# Patient Record
Sex: MALE | Race: WHITE | URBAN - METROPOLITAN AREA
[De-identification: names, ages, dates, MRNs, and addresses within clinical notes are randomized per-mention and may not be internally consistent; named-entity substitution may affect disease eponyms.]

---

## 2017-12-13 ENCOUNTER — OFFICE VISIT (OUTPATIENT)
Dept: FAMILY MEDICINE | Facility: CLINIC | Age: 22
End: 2017-12-13

## 2017-12-13 ENCOUNTER — RADIANT APPOINTMENT (OUTPATIENT)
Dept: GENERAL RADIOLOGY | Facility: CLINIC | Age: 22
End: 2017-12-13
Attending: PHYSICIAN ASSISTANT

## 2017-12-13 VITALS
BODY MASS INDEX: 30.62 KG/M2 | SYSTOLIC BLOOD PRESSURE: 106 MMHG | HEIGHT: 66 IN | WEIGHT: 190.5 LBS | DIASTOLIC BLOOD PRESSURE: 70 MMHG | OXYGEN SATURATION: 97 % | HEART RATE: 88 BPM | TEMPERATURE: 98.2 F

## 2017-12-13 DIAGNOSIS — Z23 NEED FOR PROPHYLACTIC VACCINATION AND INOCULATION AGAINST INFLUENZA: ICD-10-CM

## 2017-12-13 DIAGNOSIS — M79.642 PAIN OF LEFT HAND: ICD-10-CM

## 2017-12-13 DIAGNOSIS — S60.032S: Primary | ICD-10-CM

## 2017-12-13 PROCEDURE — 90686 IIV4 VACC NO PRSV 0.5 ML IM: CPT | Performed by: PHYSICIAN ASSISTANT

## 2017-12-13 PROCEDURE — 90471 IMMUNIZATION ADMIN: CPT | Performed by: PHYSICIAN ASSISTANT

## 2017-12-13 PROCEDURE — 73130 X-RAY EXAM OF HAND: CPT | Mod: LT

## 2017-12-13 PROCEDURE — 99203 OFFICE O/P NEW LOW 30 MIN: CPT | Mod: 25 | Performed by: PHYSICIAN ASSISTANT

## 2017-12-13 ASSESSMENT — ANXIETY QUESTIONNAIRES
GAD7 TOTAL SCORE: 2
5. BEING SO RESTLESS THAT IT IS HARD TO SIT STILL: NOT AT ALL
2. NOT BEING ABLE TO STOP OR CONTROL WORRYING: NOT AT ALL
IF YOU CHECKED OFF ANY PROBLEMS ON THIS QUESTIONNAIRE, HOW DIFFICULT HAVE THESE PROBLEMS MADE IT FOR YOU TO DO YOUR WORK, TAKE CARE OF THINGS AT HOME, OR GET ALONG WITH OTHER PEOPLE: NOT DIFFICULT AT ALL
6. BECOMING EASILY ANNOYED OR IRRITABLE: SEVERAL DAYS
3. WORRYING TOO MUCH ABOUT DIFFERENT THINGS: NOT AT ALL
7. FEELING AFRAID AS IF SOMETHING AWFUL MIGHT HAPPEN: NOT AT ALL
1. FEELING NERVOUS, ANXIOUS, OR ON EDGE: SEVERAL DAYS

## 2017-12-13 ASSESSMENT — PATIENT HEALTH QUESTIONNAIRE - PHQ9
SUM OF ALL RESPONSES TO PHQ QUESTIONS 1-9: 2
5. POOR APPETITE OR OVEREATING: NOT AT ALL

## 2017-12-13 NOTE — PATIENT INSTRUCTIONS
Finger Contusion  You have a contusion. This is also called a bruise. There is swelling and some bleeding under the skin, but no broken bones. This injury generally takes a few days to a few weeks to heal. During that time, the bruise will typically change in color from reddish, to purple-blue, to greenish-yellow, then to yellow-brown.  A finger contusion may be treated with a splint or mandi tape (taping the injured finger to the one next to it for support). Minor contusions likely will need no other treatment.  Home care    Elevate the hand to reduce pain and swelling. As much as possible, sit or lie down with the hand raised about the level of your heart. This is especially important during the first 48 hours.    Ice the finger to help reduce pain and swelling. Wrap a cold source (ice pack or ice cubes in a plastic bag) in a thin towel. Apply to the bruised finger for 20 minutes every 1 to 2 hours the first day. Continue this 3 to 4 times a day until the pain and swelling goes away.    If mandi tape was applied and it becomes wet or dirty, change it. You may replace it with paper, plastic, or cloth tape. Before taping, put a thin strip of cotton or gauze between the fingers to absorb sweat. This will help prevent any breakdown of skin or fungal infections.     Unless another medicine was prescribed, you can take acetaminophen, ibuprofen, or naproxen to control pain. (If you have chronic liver or kidney disease or ever had a stomach ulcer or gastrointestinal bleeding, talk with your doctor before using these medicines.)  Follow up  Follow up with your healthcare provider, or as advised. Call if you are not improving within 1 to 2 weeks.  When to seek medical advice   Call your healthcare provider right away if you have any of the following:    Increased pain or swelling    Hand or arm becomes cold, blue, numb or tingly    Signs of infection: Warmth, drainage, or increased redness or pain around the bruise     Inability to move the injured finger or hand     Frequent bruising for unknown reasons  Date Last Reviewed: 5/1/2017 2000-2017 [employer]. [address] [place] [address], [place] [address]. All rights reserved. This information is not intended as a substitute for professional medical care. Always follow your healthcare professional's instructions.

## 2017-12-13 NOTE — PROGRESS NOTES

## 2017-12-13 NOTE — NURSING NOTE
"Chief Complaint   Patient presents with     Musculoskeletal Problem     left hand middle finger pain while setting up ladder        Initial /70  Temp 98.2  F (36.8  C) (Tympanic)  Ht 5' 6\" (1.676 m)  Wt 190 lb 8 oz (86.4 kg)  SpO2 97%  BMI 30.75 kg/m2 Estimated body mass index is 30.75 kg/(m^2) as calculated from the following:    Height as of this encounter: 5' 6\" (1.676 m).    Weight as of this encounter: 190 lb 8 oz (86.4 kg).  Medication Reconciliation: complete    "

## 2017-12-13 NOTE — LETTER
[facility]  [address] [place] [address]                              [phone]   December 13, 2017    Mathew Grove  [address] [place] [address]      Dear Mathew,    Here is a summary of your recent test results:  Your Xray was normal.    Your tests results are enclosed.    Please call us at [phone] (or use [employer]) to address the above recommendations.            Thank you very much for choosing [facility].     Best regards,      Sara Abarca PA-C    Results for orders placed or performed in visit on 12/13/17   XR Hand Left G/E 3 Views    Narrative    LEFT HAND THREE OR MORE VIEWS  12/13/2017 1:34 PM     HISTORY:  Pain of left hand.    COMPARISON: None.      Impression    IMPRESSION: Bones are normally aligned. No acute fracture.    OREN ALEXANDER MD

## 2017-12-13 NOTE — MR AVS SNAPSHOT
After Visit Summary   12/13/2017    Mathew Grove    MRN: [id number]           Patient Information     Date Of Birth          1995        Visit Information        Provider Department      12/13/2017 2:00 PM Sara Abarca PA-C [facility] [place]        Today's Diagnoses     Contusion of left middle finger without damage to nail, sequela    -  1    Pain of left hand          Care Instructions      Finger Contusion  You have a contusion. This is also called a bruise. There is swelling and some bleeding under the skin, but no broken bones. This injury generally takes a few days to a few weeks to heal. During that time, the bruise will typically change in color from reddish, to purple-blue, to greenish-yellow, then to yellow-brown.  A finger contusion may be treated with a splint or mandi tape (taping the injured finger to the one next to it for support). Minor contusions likely will need no other treatment.  Home care    Elevate the hand to reduce pain and swelling. As much as possible, sit or lie down with the hand raised about the level of your heart. This is especially important during the first 48 hours.    Ice the finger to help reduce pain and swelling. Wrap a cold source (ice pack or ice cubes in a plastic bag) in a thin towel. Apply to the bruised finger for 20 minutes every 1 to 2 hours the first day. Continue this 3 to 4 times a day until the pain and swelling goes away.    If mandi tape was applied and it becomes wet or dirty, change it. You may replace it with paper, plastic, or cloth tape. Before taping, put a thin strip of cotton or gauze between the fingers to absorb sweat. This will help prevent any breakdown of skin or fungal infections.     Unless another medicine was prescribed, you can take acetaminophen, ibuprofen, or naproxen to control pain. (If you have chronic liver or kidney disease or ever had a stomach ulcer or gastrointestinal bleeding, talk with your  doctor before using these medicines.)  Follow up  Follow up with your healthcare provider, or as advised. Call if you are not improving within 1 to 2 weeks.  When to seek medical advice   Call your healthcare provider right away if you have any of the following:    Increased pain or swelling    Hand or arm becomes cold, blue, numb or tingly    Signs of infection: Warmth, drainage, or increased redness or pain around the bruise    Inability to move the injured finger or hand     Frequent bruising for unknown reasons  Date Last Reviewed: 5/1/2017 2000-2017 [employer]. [address] [place] [address]. All rights reserved. This information is not intended as a substitute for professional medical care. Always follow your healthcare professional's instructions.                Follow-ups after your visit        Your next 10 appointments already scheduled     Dec 13, 2017  2:00 PM CST   Office Visit with Sara Abarca PA-C   [facility] ([facility]    [address] [place] [phone]           Bring a current list of meds and any records pertaining to this visit. For Physicals, please bring immunization records and any forms needing to be filled out. Please arrive 10 minutes early to complete paperwork.              Who to contact     If you have questions or need follow up information about today's clinic visit or your schedule please contact [facility] directly at [phone].  Normal or non-critical lab and imaging results will be communicated to you by MyChart, letter or phone within 4 business days after the clinic has received the results. If you do not hear from us within 7 days, please contact the clinic through MyChart or phone. If you have a critical or abnormal lab result, we will notify you by phone as soon as possible.  Submit refill requests through [employer] or call your pharmacy and they  "will forward the refill request to us. Please allow 3 business days for your refill to be completed.          Additional Information About Your Visit        [employer] Information     [employer] lets you send messages to your doctor, view your test results, renew your prescriptions, schedule appointments and more. To sign up, go to [email or web address] . Click on \"Log in\" on the left side of the screen, which will take you to the Welcome page. Then click on \"Sign up Now\" on the right side of the page.     You will be asked to enter the access code listed below, as well as some personal information. Please follow the directions to create your username and password.     Your access code is: [email or web address]  Expires: 3/13/2018  1:38 PM     Your access code will  in 90 days. If you need help or a new code, please call your [place] clinic or [phone].        Care EveryWhere ID     This is your Care EveryWhere ID. This could be used by other organizations to access your [place] medical records  [id number]        Your Vitals Were     Pulse Temperature Height Pulse Oximetry BMI (Body Mass Index)       88 98.2  F (36.8  C) (Tympanic) 5' 6\" (1.676 m) 97% 30.75 kg/m2        Blood Pressure from Last 3 Encounters:   17 106/70   12 106/76   12 110/70    Weight from Last 3 Encounters:   17 190 lb 8 oz (86.4 kg)   12 144 lb (65.3 kg) (39 %)*   12 152 lb (68.9 kg) (56 %)*     * Growth percentiles are based on CDC 2-20 Years data.               Primary Care Provider Office Phone # Fax #    Tray Buenrostro PA-C [phone] [address] [place] [address]        Equal Access to Services     [facility] REGULO : Екатерина Gomez, wafabiana luqadaha, qaybta kaalestefani lucas . [facility] [phone].    ATENCIÓN: Si habla español, tiene a dale disposición servicios gratuitos de asistencia lingüística. Llame al " [phone].    We comply with applicable federal civil rights laws and [place] laws. We do not discriminate on the basis of race, color, national origin, age, disability, sex, sexual orientation, or gender identity.            Thank you!     Thank you for choosing [facility]  for your care. Our goal is always to provide you with excellent care. Hearing back from our patients is one way we can continue to improve our services. Please take a few minutes to complete the written survey that you may receive in the mail after your visit with us. Thank you!             Your Updated Medication List - Protect others around you: Learn how to safely use, store and throw away your medicines at www.disposemymeds.org.          This list is accurate as of: 12/13/17  1:38 PM.  Always use your most recent med list.                   Brand Name Dispense Instructions for use Diagnosis    CENTRUM SILVER per tablet     90    ONE DAILY

## 2017-12-13 NOTE — PROGRESS NOTES
SUBJECTIVE:   Mathew Grove is a 21 year old male who presents to clinic today for the following health issues:    Finger Pain  Mathew presents to clinic reporting third finger pain on his left hand. He reports that he twisted the finger yesterday while setting up a three story ladder. He notes that his pain extends throughout his whole finger and his pain is exacerbated with extension of the finger. He also reports some mild bruising. He has not treated the finger with ice heat or medications.  He denies any injury to this finger in the past.    Problem list and histories reviewed & adjusted, as indicated.  Additional history: as documented    Patient Active Problem List   Diagnosis     Attention deficit hyperactivity disorder (ADHD)     Mild major depression (H)     Past Surgical History:   Procedure Laterality Date     NO HISTORY OF SURGERY         Social History   Substance Use Topics     Smoking status: Former Smoker     Packs/day: 0.20     Quit date: 11/29/2017     Smokeless tobacco: Never Used     Alcohol use 1.2 oz/week     2 Standard drinks or equivalent per week     History reviewed. No pertinent family history.      No current outpatient prescriptions on file.     Allergies   Allergen Reactions     No Known Drug Allergies      Reviewed and updated as needed this visit by clinical staff  Tobacco  Allergies  Meds  Problems  Med Hx  Surg Hx  Fam Hx  Soc Hx        Reviewed and updated as needed this visit by Provider  Tobacco  Allergies  Meds  Problems  Med Hx  Surg Hx  Fam Hx  Soc Hx          ROS:  Constitutional, HEENT, cardiovascular, pulmonary, GI, , musculoskeletal, neuro, skin, endocrine and psych systems are negative, except as otherwise noted.    This document serves as a record of the services and decisions personally performed and made by Sara Abarca PA-C. It was created on her behalf by Scotty Chery, a trained medical scribe. The creation of this document is based on  "the provider's statements to the medical scribe.  Scotty Chery 1:20 PM December 13, 2017    OBJECTIVE:   /70  Pulse 88  Temp 98.2  F (36.8  C) (Tympanic)  Ht 5' 6\" (1.676 m)  Wt 190 lb 8 oz (86.4 kg)  SpO2 97%  BMI 30.75 kg/m2  Body mass index is 30.75 kg/(m^2).  GENERAL: healthy, alert and no distress  MS: pain with resisted extension of the left third digit, pain at MCP joint of the left third digit, painful flexion of MCP joint, otherwise no gross musculoskeletal defects noted, no edema  SKIN: no suspicious lesions or rashes  PSYCH: mentation appears normal, affect normal/bright    Diagnostic Test Results:  Recent Results (from the past 744 hour(s))   XR Hand Left G/E 3 Views    Narrative    LEFT HAND THREE OR MORE VIEWS  12/13/2017 1:34 PM     HISTORY:  Pain of left hand.    COMPARISON: None.      Impression    IMPRESSION: Bones are normally aligned. No acute fracture.    OREN ALEXANDER MD     ASSESSMENT/PLAN:   Mathew was seen today for musculoskeletal problem.    Diagnoses and all orders for this visit:    Pain of left hand, Contusion of left middle finger without damage to nail, sequela  XR negative for fracture. Advised patient to treat the affected digit with ice and ibuprofen to alleviate his pain. Recommended cessation of any activities that cause the finger pain.  -     XR Hand Left G/E 3 Views; Future    The information in this document, created by the medical scribe for me, accurately reflects the services I personally performed and the decisions made by me. I have reviewed and approved this document for accuracy prior to leaving the patient care area.  December 13, 2017 1:20 PM    Sara Abarca PA-C  [facility]    "

## 2017-12-14 ASSESSMENT — ANXIETY QUESTIONNAIRES: GAD7 TOTAL SCORE: 2

## 2018-04-24 ENCOUNTER — OFFICE VISIT (OUTPATIENT)
Dept: FAMILY MEDICINE | Facility: CLINIC | Age: 23
End: 2018-04-24
Payer: COMMERCIAL

## 2018-04-24 VITALS
BODY MASS INDEX: 34.55 KG/M2 | DIASTOLIC BLOOD PRESSURE: 76 MMHG | HEIGHT: 66 IN | HEART RATE: 125 BPM | SYSTOLIC BLOOD PRESSURE: 120 MMHG | WEIGHT: 215 LBS | OXYGEN SATURATION: 97 % | TEMPERATURE: 98.7 F

## 2018-04-24 DIAGNOSIS — J02.9 SORE THROAT: Primary | ICD-10-CM

## 2018-04-24 DIAGNOSIS — J06.9 VIRAL UPPER RESPIRATORY TRACT INFECTION: ICD-10-CM

## 2018-04-24 DIAGNOSIS — F32.0 MILD MAJOR DEPRESSION (H): ICD-10-CM

## 2018-04-24 LAB
DEPRECATED S PYO AG THROAT QL EIA: NORMAL
SPECIMEN SOURCE: NORMAL

## 2018-04-24 PROCEDURE — 87081 CULTURE SCREEN ONLY: CPT | Performed by: PHYSICIAN ASSISTANT

## 2018-04-24 PROCEDURE — 99213 OFFICE O/P EST LOW 20 MIN: CPT | Performed by: PHYSICIAN ASSISTANT

## 2018-04-24 PROCEDURE — 87880 STREP A ASSAY W/OPTIC: CPT | Performed by: PHYSICIAN ASSISTANT

## 2018-04-24 NOTE — PATIENT INSTRUCTIONS
Viral Upper Respiratory Illness (Adult)  You have a viral upper respiratory illness (URI), which is another term for the common cold. This illness is contagious during the first few days. It is spread through the air by coughing and sneezing. It may also be spread by direct contact (touching the sick person and then touching your own eyes, nose, or mouth). Frequent handwashing will decrease risk of spread. Most viral illnesses go away within 7 to 10 days with rest and simple home remedies. Sometimes the illness may last for several weeks. Antibiotics will not kill a virus, and they are generally not prescribed for this condition.    Home care    If symptoms are severe, rest at home for the first 2 to 3 days. When you resume activity, don't let yourself get too tired.    Avoid being exposed to cigarette smoke (yours or others ).    You may use acetaminophen or ibuprofen to control pain and fever, unless another medicine was prescribed. If you have chronic liver or kidney disease, have ever had a stomach ulcer or gastrointestinal bleeding, or are taking blood-thinning medicines, talk with your healthcare provider before using these medicines. Aspirin should never be given to anyone under 18 years of age who is ill with a viral infection or fever. It may cause severe liver or brain damage.    Your appetite may be poor, so a light diet is fine. Avoid dehydration by drinking 6 to 8 glasses of fluids per day (water, soft drinks, juices, tea, or soup). Extra fluids will help loosen secretions in the nose and lungs.    Over-the-counter cold medicines will not shorten the length of time you re sick, but they may be helpful for the following symptoms: cough, sore throat, and nasal and sinus congestion. (Note: Do not use decongestants if you have high blood pressure.)  Follow-up care  Follow up with your healthcare provider, or as advised.  When to seek medical advice  Call your healthcare provider right away if any of these  occur:    Cough with lots of colored sputum (mucus)    Severe headache; face, neck, or ear pain    Difficulty swallowing due to throat pain    Fever of 100.4 F (38 C) or higher, or as directed by your healthcare provider  Call 911  Call 911 if any of these occur:    Chest pain, shortness of breath, wheezing, or difficulty breathing    Coughing up blood    Inability to swallow due to throat pain  Date Last Reviewed: 9/13/2015 2000-2017 [employer]. [address] [place] [address]. All rights reserved. This information is not intended as a substitute for professional medical care. Always follow your healthcare professional's instructions.

## 2018-04-24 NOTE — LETTER
My Depression Action Plan  Name: Mathew Grove   Date of Birth 1995  Date: 4/24/2018    My doctor: Kamryn Hawley Caroline   My clinic: [facility] [address] [id number] [phone]          GREEN    ZONE   Good Control    What it looks like:     Things are going generally well. You have normal up s and down s. You may even feel depressed from time to time, but bad moods usually last less than a day.   What you need to do:  1. Continue to care for yourself (see self care plan)  2. Check your depression survival kit and update it as needed  3. Follow your physician s recommendations including any medication.  4. Do not stop taking medication unless you consult with your physician first.           YELLOW         ZONE Getting Worse    What it looks like:     Depression is starting to interfere with your life.     It may be hard to get out of bed; you may be starting to isolate yourself from others.    Symptoms of depression are starting to last most all day and this has happened for several days.     You may have suicidal thoughts but they are not constant.   What you need to do:     1. Call your care team, your response to treatment will improve if you keep your care team informed of your progress. Yellow periods are signs an adjustment may need to be made.     2. Continue your self-care, even if you have to fake it!    3. Talk to someone in your support network    4. Open up your depression survival kit           RED    ZONE Medical Alert - Get Help    What it looks like:     Depression is seriously interfering with your life.     You may experience these or other symptoms: You can t get out of bed most days, can t work or engage in other necessary activities, you have trouble taking care of basic hygiene, or basic responsibilities, thoughts of suicide or death that will not go away, self-injurious behavior.     What you need to do:  1. Call  your care team and request a same-day appointment. If they are not available (weekends or after hours) call your local crisis line, emergency room or 911.            Depression Self Care Plan / Survival Kit    Self-Care for Depression  Here s the deal. Your body and mind are really not as separate as most people think.  What you do and think affects how you feel and how you feel influences what you do and think. This means if you do things that people who feel good do, it will help you feel better.  Sometimes this is all it takes.  There is also a place for medication and therapy depending on how severe your depression is, so be sure to consult with your medical provider and/ or Behavioral Health Consultant if your symptoms are worsening or not improving.     In order to better manage my stress, I will:    Exercise  Get some form of exercise, every day. This will help reduce pain and release endorphins, the  feel good  chemicals in your brain. This is almost as good as taking antidepressants!  This is not the same as joining a gym and then never going! (they count on that by the way ) It can be as simple as just going for a walk or doing some gardening, anything that will get you moving.      Hygiene   Maintain good hygiene (Get out of bed in the morning, Make your bed, Brush your teeth, Take a shower, and Get dressed like you were going to work, even if you are unemployed).  If your clothes don't fit try to get ones that do.    Diet  I will strive to eat foods that are good for me, drink plenty of water, and avoid excessive sugar, caffeine, alcohol, and other mood-altering substances.  Some foods that are helpful in depression are: complex carbohydrates, B vitamins, flaxseed, fish or fish oil, fresh fruits and vegetables.    Psychotherapy  I agree to participate in Individual Therapy (if recommended).    Medication  If prescribed medications, I agree to take them.  Missing doses can result in serious side effects.   I understand that drinking alcohol, or other illicit drug use, may cause potential side effects.  I will not stop my medication abruptly without first discussing it with my provider.    Staying Connected With Others  I will stay in touch with my friends, family members, and my primary care provider/team.    Use your imagination  Be creative.  We all have a creative side; it doesn t matter if it s oil painting, sand castles, or mud pies! This will also kick up the endorphins.    Witness Beauty  (AKA stop and smell the roses) Take a look outside, even in mid-winter. Notice colors, textures. Watch the squirrels and birds.     Service to others  Be of service to others.  There is always someone else in need.  By helping others we can  get out of ourselves  and remember the really important things.  This also provides opportunities for practicing all the other parts of the program.    Humor  Laugh and be silly!  Adjust your TV habits for less news and crime-drama and more comedy.    Control your stress  Try breathing deep, massage therapy, biofeedback, and meditation. Find time to relax each day.     My support system    Clinic Contact:  Phone number:    Contact 1:  Phone number:    Contact 2:  Phone number:    Mandaen/:  Phone number:    Therapist:  Phone number:    [facility] crisis center:    Phone number:    Other community support:  Phone number:

## 2018-04-24 NOTE — MR AVS SNAPSHOT
After Visit Summary   4/24/2018    Mathew Grove    MRN: [id number]           Patient Information     Date Of Birth          1995        Visit Information        Provider Department      4/24/2018 3:00 PM Renetta Meng PA-C [facility]        Today's Diagnoses     Sore throat    -  1      Care Instructions      Viral Upper Respiratory Illness (Adult)  You have a viral upper respiratory illness (URI), which is another term for the common cold. This illness is contagious during the first few days. It is spread through the air by coughing and sneezing. It may also be spread by direct contact (touching the sick person and then touching your own eyes, nose, or mouth). Frequent handwashing will decrease risk of spread. Most viral illnesses go away within 7 to 10 days with rest and simple home remedies. Sometimes the illness may last for several weeks. Antibiotics will not kill a virus, and they are generally not prescribed for this condition.    Home care    If symptoms are severe, rest at home for the first 2 to 3 days. When you resume activity, don't let yourself get too tired.    Avoid being exposed to cigarette smoke (yours or others ).    You may use acetaminophen or ibuprofen to control pain and fever, unless another medicine was prescribed. If you have chronic liver or kidney disease, have ever had a stomach ulcer or gastrointestinal bleeding, or are taking blood-thinning medicines, talk with your healthcare provider before using these medicines. Aspirin should never be given to anyone under 18 years of age who is ill with a viral infection or fever. It may cause severe liver or brain damage.    Your appetite may be poor, so a light diet is fine. Avoid dehydration by drinking 6 to 8 glasses of fluids per day (water, soft drinks, juices, tea, or soup). Extra fluids will help loosen secretions in the nose and lungs.    Over-the-counter cold medicines will not shorten the  length of time you re sick, but they may be helpful for the following symptoms: cough, sore throat, and nasal and sinus congestion. (Note: Do not use decongestants if you have high blood pressure.)  Follow-up care  Follow up with your healthcare provider, or as advised.  When to seek medical advice  Call your healthcare provider right away if any of these occur:    Cough with lots of colored sputum (mucus)    Severe headache; face, neck, or ear pain    Difficulty swallowing due to throat pain    Fever of 100.4 F (38 C) or higher, or as directed by your healthcare provider  Call 911  Call 911 if any of these occur:    Chest pain, shortness of breath, wheezing, or difficulty breathing    Coughing up blood    Inability to swallow due to throat pain  Date Last Reviewed: 9/13/2015 2000-2017 [employer]. [address] [place] [address]. All rights reserved. This information is not intended as a substitute for professional medical care. Always follow your healthcare professional's instructions.                Follow-ups after your visit        Who to contact     If you have questions or need follow up information about today's clinic visit or your schedule please contact [facility] directly at [phone].  Normal or non-critical lab and imaging results will be communicated to you by MyChart, letter or phone within 4 business days after the clinic has received the results. If you do not hear from us within 7 days, please contact the clinic through MyChart or phone. If you have a critical or abnormal lab result, we will notify you by phone as soon as possible.  Submit refill requests through [employer] or call your pharmacy and they will forward the refill request to us. Please allow 3 business days for your refill to be completed.          Additional Information About Your Visit        [employer] Information     [employer] lets you send messages to your doctor, view your test results,  "renew your prescriptions, schedule appointments and more. To sign up, go to [email or web address] . Click on \"Log in\" on the left side of the screen, which will take you to the Welcome page. Then click on \"Sign up Now\" on the right side of the page.     You will be asked to enter the access code listed below, as well as some personal information. Please follow the directions to create your username and password.     Your access code is: [email or web address]  Expires: 2018  4:49 PM     Your access code will  in 90 days. If you need help or a new code, please call your [place] clinic or [phone].        Care EveryWhere ID     This is your Care EveryWhere ID. This could be used by other organizations to access your [place] medical records  [id number]        Your Vitals Were     Pulse Temperature Height Pulse Oximetry BMI (Body Mass Index)       125 98.7  F (37.1  C) (Oral) 5' 6\" (1.676 m) 97% 34.7 kg/m2        Blood Pressure from Last 3 Encounters:   18 120/76   17 106/70   12 106/76    Weight from Last 3 Encounters:   18 215 lb (97.5 kg)   17 190 lb 8 oz (86.4 kg)   12 144 lb (65.3 kg) (39 %)*     * Growth percentiles are based on CDC 2-20 Years data.              We Performed the Following     Beta strep group A culture     Strep, Rapid Screen        Primary Care Provider Office Phone # Fax #    [facility] [phone]       [address] [place] [address]        Equal Access to Services     SALIMA RAJPUT : Hadii cristian Gomez, naheedda luabisaiadaha, qaybta kaalmada estefani rojas. So [facility] [phone].    ATENCIÓN: Si habla español, tiene a dale disposición servicios gratuitos de asistencia lingüística. Llame al [phone].    We comply with applicable federal civil rights laws and [place] laws. We do not discriminate on the basis of race, color, national origin, age, disability, " sex, sexual orientation, or gender identity.            Thank you!     Thank you for choosing [facility]  for your care. Our goal is always to provide you with excellent care. Hearing back from our patients is one way we can continue to improve our services. Please take a few minutes to complete the written survey that you may receive in the mail after your visit with us. Thank you!             Your Updated Medication List - Protect others around you: Learn how to safely use, store and throw away your medicines at www.disposemymeds.org.      Notice  As of 4/24/2018  4:49 PM    You have not been prescribed any medications.

## 2018-04-24 NOTE — PROGRESS NOTES
"  SUBJECTIVE:                                                    Mathew Grove is a 22 year old male who presents to clinic today for the following health issues:      Acute Illness   Acute illness concerns: Sore throat  Onset: x3-4 days    Fever: no    Chills/Sweats: no    Headache (location?): YES- all over    Sinus Pressure:YES    Conjunctivitis:  no    Ear Pain: YES- plugged    Rhinorrhea: YES - clear    Congestion: YES- chest    Sore Throat: YES- constant     Cough: YES-productive of yellow sputum    Wheeze: YES- sometimes    Decreased Appetite: YES    Nausea: no    Vomiting: no    Diarrhea:  no    Dysuria/Freq.: no    Fatigue/Achiness: YES- more tired    Sick/Strep Exposure: no     Therapies Tried and outcome: OJ and cough drops - advil - minor relief    Patient reports that he has been coughing and feeling congested.  He also has throat pain.  He states that the symptoms have been present for the past 4 days.    Patient has not had fevers or chills.    He reports some shortness of breath, but not so much wheezing.    He does not have any ear pain.      Problem list and histories reviewed & adjusted, as indicated.  Additional history: as documented      ROS:  Constitutional, HEENT, cardiovascular, pulmonary, GI, , musculoskeletal, neuro, skin, endocrine and psych systems are negative, except as otherwise noted.    OBJECTIVE:                                                    /76 (BP Location: Left arm, Patient Position: Chair, Cuff Size: Adult Large)  Pulse 125  Temp 98.7  F (37.1  C) (Oral)  Ht 5' 6\" (1.676 m)  Wt 215 lb (97.5 kg)  SpO2 97%  BMI 34.7 kg/m2  Body mass index is 34.7 kg/(m^2).  GENERAL: healthy, alert and no distress  EYES: Eyes grossly normal to inspection, PERRL and conjunctivae and sclerae normal  HENT: ear canals and TM's normal, nose and mouth without ulcers or lesions  NECK: no adenopathy, no asymmetry, masses, or scars and thyroid normal to palpation  RESP: lungs clear to " auscultation - no rales, rhonchi or wheezes  CV: regular rate and rhythm, normal S1 S2, no S3 or S4, no murmur, click or rub, no peripheral edema and peripheral pulses strong  MS: no gross musculoskeletal defects noted, no edema  NEURO: Normal strength and tone, mentation intact and speech normal  PSYCH: mentation appears normal, affect normal/bright    Diagnostic Test Results:  Results for orders placed or performed in visit on 04/24/18 (from the past 24 hour(s))   Strep, Rapid Screen   Result Value Ref Range    Specimen Description Throat     Rapid Strep A Screen       NEGATIVE: No Group A streptococcal antigen detected by immunoassay, await culture report.        ASSESSMENT/PLAN:                                                      Mathew was seen today for pharyngitis.    Diagnoses and all orders for this visit:    Sore throat  -     Strep, Rapid Screen  -     Beta strep group A culture    Viral upper respiratory tract infection    Mild major depression (H)  -     DEPRESSION ACTION PLAN (DAP)      - Symptoms are consistent with a viral URI.  Exam is unremarkable today.   - Patient advised of home cares for symptoms.    - Patient to followup if not improving.    - Patient should be seen sooner if needed.     -- I have discussed the patient's diagnosis, and my plan of treatment with the patient and/or family. Patient is aware to followup if symptoms do not improve.  Patient has been advised to be seen sooner or seek more immediate care if symptoms change or worsen.  Patient agrees with and understands the plan today.     See Patient Instructions        Renetta Meng PA-C    [facility] [place]

## 2018-04-25 LAB
BACTERIA SPEC CULT: NORMAL
SPECIMEN SOURCE: NORMAL

## 2018-04-28 ENCOUNTER — OFFICE VISIT (OUTPATIENT)
Dept: FAMILY MEDICINE | Facility: CLINIC | Age: 23
End: 2018-04-28
Payer: COMMERCIAL

## 2018-04-28 VITALS
DIASTOLIC BLOOD PRESSURE: 70 MMHG | BODY MASS INDEX: 34.86 KG/M2 | TEMPERATURE: 98.6 F | SYSTOLIC BLOOD PRESSURE: 114 MMHG | HEART RATE: 89 BPM | WEIGHT: 216 LBS | OXYGEN SATURATION: 99 %

## 2018-04-28 DIAGNOSIS — J01.90 ACUTE NON-RECURRENT SINUSITIS, UNSPECIFIED LOCATION: ICD-10-CM

## 2018-04-28 DIAGNOSIS — J06.9 ACUTE URI: Primary | ICD-10-CM

## 2018-04-28 PROCEDURE — 99213 OFFICE O/P EST LOW 20 MIN: CPT | Performed by: FAMILY MEDICINE

## 2018-04-28 RX ORDER — AZITHROMYCIN 250 MG/1
TABLET, FILM COATED ORAL
Qty: 6 TABLET | Refills: 0 | Status: SHIPPED | OUTPATIENT
Start: 2018-04-28 | End: 2018-04-28

## 2018-04-28 RX ORDER — AZITHROMYCIN 250 MG/1
TABLET, FILM COATED ORAL
Qty: 6 TABLET | Refills: 0 | Status: SHIPPED | OUTPATIENT
Start: 2018-04-28 | End: 2019-01-02

## 2018-04-28 NOTE — PROGRESS NOTES
SUBJECTIVE:   Mathew Grove is a 22 year old male who presents to clinic today for the following health issues:    Acute Illness   Acute illness concerns: URI  Onset: Was last seen on 04/24/18 getting worse    Fever: no    Chills/Sweats: no    Headache (location?): YES    Sinus Pressure: YES    Conjunctivitis: no    Ear Pain: no    Rhinorrhea: YES - Clear/Yellow    Congestion: YES    Sore Throat: no     Cough: YES - productive of yellow sputum    Wheeze: YES    Decreased Appetite: YES    Nausea: no    Vomiting: no    Diarrhea:  no    Dysuria/Freq.: no    Fatigue/Achiness: YES    Sick/Strep Exposure: YES - girlfriend sick     Therapies Tried and outcome: none    See recent 4/24 notes with KR    ADD/MDD:  Stable, no acute issues. Mentions that he is not taking any medications for these issues.     Problem list and histories reviewed & adjusted, as indicated.  Additional history: as documented    Reviewed and updated as needed this visit by clinical staff  Tobacco  Allergies  Meds       Reviewed and updated as needed this visit by Provider       BP Readings from Last 3 Encounters:   04/28/18 114/70   04/24/18 120/76   12/13/17 106/70       Wt Readings from Last 4 Encounters:   04/28/18 216 lb (98 kg)   04/24/18 215 lb (97.5 kg)   12/13/17 190 lb 8 oz (86.4 kg)   07/17/12 144 lb (65.3 kg) (39 %)*     * Growth percentiles are based on CDC 2-20 Years data.       Health Maintenance    Health Maintenance Due   Topic Date Due     HIV SCREEN (SYSTEM ASSIGNED)  12/28/2011       Current Problem List    Patient Active Problem List   Diagnosis     Attention deficit hyperactivity disorder (ADHD)     Mild major depression (H)       Past Medical History    No past medical history on file.    Past Surgical History    Past Surgical History:   Procedure Laterality Date     NO HISTORY OF SURGERY         Current Medications    Current Outpatient Prescriptions   Medication Sig Dispense Refill     azithromycin (ZITHROMAX) 250 MG  tablet 2 tabs day 1 and the 1 tab daily for 4 more days 6 tablet 0       Allergies    No Known Allergies    Immunizations    Immunization History   Administered Date(s) Administered     DTAP (<7y) 07/20/1998     Hib (PRP-T) 07/20/1998     Influenza (IIV3) PF 09/17/2009     Influenza Vaccine IM 3yrs+ 4 Valent IIV4 12/13/2017     MMR 07/20/1998     Meningococcal (Menactra ) 08/12/2010     OPV, trivalent, live 07/20/1998     TD (ADULT, 7+) 01/31/2006     TDAP Vaccine (Adacel) 12/22/2015       Family History    No family history on file.    Social History    Social History     Social History     Marital status: Single     Spouse name: N/A     Number of children: 0     Years of education: N/A     Occupational History     [employer]      Social History Main Topics     Smoking status: Former Smoker     Packs/day: 0.20     Quit date: 11/29/2017     Smokeless tobacco: Never Used     Alcohol use No     Drug use: No     Sexual activity: Yes     Partners: Male     Other Topics Concern     Not on file     Social History Narrative       All above reviewed and updated, all stable unless otherwise noted    Recent labs reviewed    ROS:  Constitutional, HEENT, cardiovascular, pulmonary, GI, , musculoskeletal, neuro, skin, endocrine and psych systems are negative, except as otherwise noted.    OBJECTIVE:                                                    /70  Pulse 89  Temp 98.6  F (37  C) (Oral)  Wt 216 lb (98 kg)  SpO2 99%  BMI 34.86 kg/m2  Body mass index is 34.86 kg/(m^2).  GENERAL: healthy, alert and no distress  EYES: Eyes grossly normal to inspection, extraocular movements - intact  HENT: Minimal cerumen visualized in left ear canal, otherwise, ear canals- normal; TMs- normal; Nose- normal; Mouth- no ulcers, no lesions  NECK: no tenderness, no adenopathy, no asymmetry, no masses, no stiffness; thyroid- normal to palpation  RESP: lungs clear to auscultation - no rales, no rhonchi, no wheezes  CV: regular rates  and rhythm, normal S1 S2, no S3 or S4 and no murmur, no click or rub -  ABDOMEN: Mild tenderness on palpation of bilateral lower quadrants, otherwise, soft, no hepatosplenomegaly, no masses, normal bowel sounds  MS: extremities- no gross deformities noted, no edema  SKIN: no suspicious lesions, no rashes  NEURO: strength and tone- normal, sensory exam- grossly normal, mentation- intact, speech- normal  BACK: no CVA tenderness, no paralumbar tenderness  PSYCH: Alert and oriented times 3; speech- coherent , normal rate and volume; able to articulate logical thoughts, able to abstract reason, no tangential thoughts, no hallucinations or delusions, affect- normal    DIAGNOSTICS/PROCEDURES:                                                      No results found for this or any previous visit (from the past 24 hour(s)).     ASSESSMENT/PLAN:                                                        ICD-10-CM    1. Acute URI J06.9 azithromycin (ZITHROMAX) 250 MG tablet     DISCONTINUED: azithromycin (ZITHROMAX) 250 MG tablet   2. Acute non-recurrent sinusitis, unspecified location J01.90 azithromycin (ZITHROMAX) 250 MG tablet     DISCONTINUED: azithromycin (ZITHROMAX) 250 MG tablet     Discussed treatment/modality options, including risk and benefits, he desires new medication (Zithromax) since symptoms haven't improved and worsening over the course of 4 days. Recommended he take Mucinex DM.. All diagnosis above reviewed and noted above, otherwise stable. See [facility] orders for further details.     Health Maintenance Due   Topic Date Due     HIV SCREEN (SYSTEM ASSIGNED)  12/28/2011     Follow up with Provider - YASSINE Higgins MD, FAAFP    [address]  [address] [phone] Fax    This document serves as a record of the services and decisions personally performed and made by Karan Higgins MD. It was created on his behalf by javier Goncalves  trained medical scribe. The creation of this document is based the provider's statements to the medical scribe. I have reviewed and approved this document for accuracy prior to leaving the patient care area. Karan Higgins MD April 28, 2018 11:30 AM

## 2018-04-28 NOTE — MR AVS SNAPSHOT
After Visit Summary   4/28/2018    Mathew Grove    MRN: [id number]           Patient Information     Date Of Birth          1995        Visit Information        Provider Department      4/28/2018 11:40 AM Karan Higgins MD [facility]        Today's Diagnoses     Acute URI    -  1    Acute non-recurrent sinusitis, unspecified location          Care Instructions    Plan:  1- Start Zithromax antibiotic.    2- Start Mucinex DM.    [facility]                        To reach your care team during and after hours:   [phone]  To reach our pharmacy:        [phone]    Clinic Hours                        Our clinic hours are:    Monday   7:30 am to 7:00 pm                  Tuesday through Friday 7:30 am to 5:00 pm                             Saturday   8:00 am to 12:00 pm      Sunday   Closed      Pharmacy Hours                        Our pharmacy hours are:    Monday   8:30 am to 7:00 pm       Tuesday to Friday  8:30 am to 6:00 pm                       Saturday    9:00 am to 1:00 pm              Sunday    Closed              There is also information available at our web site:  [email or web address]    If your provider ordered any lab tests and you do not receive the results within 10 business days, please call the clinic.    If you need a medication refill please contact your pharmacy.  Please allow 2-3 business days for your refill to be completed.    Our clinic offers telephone visits and e visits.  Please ask one of your team members to explain more.      Use [employer] (secure email communication and access to your chart) to send your primary care provider a message or make an appointment. Ask someone on your Team how to sign up for [employer].  Immunizations                      Immunization History   Administered Date(s) Administered     DTAP (<7y) 07/20/1998     Hib (PRP-T) 07/20/1998     Influenza (IIV3) PF 09/17/2009     Influenza Vaccine IM 3yrs+ 4 Valent IIV4  "2017     MMR 1998     Meningococcal (Menactra ) 2010     OPV, trivalent, live 1998     TD (ADULT, 7+) 2006     TDAP Vaccine (Adacel) 2015        Health Maintenance                         Health Maintenance Due   Topic Date Due     HIV SCREEN (SYSTEM ASSIGNED)  2011               Follow-ups after your visit        Who to contact     If you have questions or need follow up information about today's clinic visit or your schedule please contact [facility] [place] directly at [phone].  Normal or non-critical lab and imaging results will be communicated to you by [employer], letter or phone within 4 business days after the clinic has received the results. If you do not hear from us within 7 days, please contact the clinic through [employer] or phone. If you have a critical or abnormal lab result, we will notify you by phone as soon as possible.  Submit refill requests through [employer] or call your pharmacy and they will forward the refill request to us. Please allow 3 business days for your refill to be completed.          Additional Information About Your Visit        MyChart Information     [employer] lets you send messages to your doctor, view your test results, renew your prescriptions, schedule appointments and more. To sign up, go to [email or web address] . Click on \"Log in\" on the left side of the screen, which will take you to the Welcome page. Then click on \"Sign up Now\" on the right side of the page.     You will be asked to enter the access code listed below, as well as some personal information. Please follow the directions to create your username and password.     Your access code is: [email or web address]  Expires: 2018  4:49 PM     Your access code will  in 90 days. If you need help or a new code, please call your [facility] or [phone].        Care EveryWhere ID     This is your Care EveryWhere ID. This could be used by other organizations to " access your [place] medical records  [id number]        Your Vitals Were     Pulse Temperature Pulse Oximetry BMI (Body Mass Index)          89 98.6  F (37  C) (Oral) 99% 34.86 kg/m2         Blood Pressure from Last 3 Encounters:   04/28/18 114/70   04/24/18 120/76   12/13/17 106/70    Weight from Last 3 Encounters:   04/28/18 216 lb (98 kg)   04/24/18 215 lb (97.5 kg)   12/13/17 190 lb 8 oz (86.4 kg)              Today, you had the following     No orders found for display         Today's Medication Changes          These changes are accurate as of 4/28/18 11:49 AM.  If you have any questions, ask your nurse or doctor.               Start taking these medicines.        Dose/Directions    azithromycin 250 MG tablet   Commonly known as:  ZITHROMAX   Used for:  Acute URI, Acute non-recurrent sinusitis, unspecified location   Started by:  Karan Higgins MD        2 tabs day 1 and the 1 tab daily for 4 more days   Quantity:  6 tablet   Refills:  0            Where to get your medicines      These medications were sent to [facility] [id number] [facility] [employer] - [facility] [address] [facility] [address]  [address] [facility] [address], [place] [address]     Phone:  [phone]     azithromycin 250 MG tablet                Primary Care Provider Office Phone # Fax #    [facility] [phone]       [address] [place] [address]        Equal Access to Services     SALIMA RAJPUT AH: Hadii cristian romano hadasho Sojaniyaali, waaxda luqadaha, qaybta kaalmada adequangyada, waxtequila jr ferrera. So [facility] [phone].    ATENCIÓN: Si habla español, tiene a dale disposición servicios gratuitos de asistencia lingüística. Llame al [phone].    We comply with applicable federal civil rights laws and [place] laws. We do not discriminate on the basis of race, color, national origin, age, disability, sex, sexual orientation, or gender identity.            Thank you!     Thank you for choosing [facility]   for your care. Our goal is always to provide you with excellent care. Hearing back from our patients is one way we can continue to improve our services. Please take a few minutes to complete the written survey that you may receive in the mail after your visit with us. Thank you!             Your Updated Medication List - Protect others around you: Learn how to safely use, store and throw away your medicines at www.disposemymeds.org.          This list is accurate as of 4/28/18 11:49 AM.  Always use your most recent med list.                   Brand Name Dispense Instructions for use Diagnosis    azithromycin 250 MG tablet    ZITHROMAX    6 tablet    2 tabs day 1 and the 1 tab daily for 4 more days    Acute URI, Acute non-recurrent sinusitis, unspecified location

## 2018-04-28 NOTE — PATIENT INSTRUCTIONS
Plan:  1- Start Zithromax antibiotic.    2- Start Mucinex DM.    [facility] [place]                        To reach your care team during and after hours:   [phone]  To reach our pharmacy:        [phone]    Clinic Hours                        Our clinic hours are:    Monday   7:30 am to 7:00 pm                  Tuesday through Friday 7:30 am to 5:00 pm                             Saturday   8:00 am to 12:00 pm      Sunday   Closed      Pharmacy Hours                        Our pharmacy hours are:    Monday   8:30 am to 7:00 pm       Tuesday to Friday  8:30 am to 6:00 pm                       Saturday    9:00 am to 1:00 pm              Sunday    Closed              There is also information available at our web site:  [email or web address]    If your provider ordered any lab tests and you do not receive the results within 10 business days, please call the clinic.    If you need a medication refill please contact your pharmacy.  Please allow 2-3 business days for your refill to be completed.    Our clinic offers telephone visits and e visits.  Please ask one of your team members to explain more.      Use [employer] (secure email communication and access to your chart) to send your primary care provider a message or make an appointment. Ask someone on your Team how to sign up for [employer].  Immunizations                      Immunization History   Administered Date(s) Administered     DTAP (<7y) 07/20/1998     Hib (PRP-T) 07/20/1998     Influenza (IIV3) PF 09/17/2009     Influenza Vaccine IM 3yrs+ 4 Valent IIV4 12/13/2017     MMR 07/20/1998     Meningococcal (Menactra ) 08/12/2010     OPV, trivalent, live 07/20/1998     TD (ADULT, 7+) 01/31/2006     TDAP Vaccine (Adacel) 12/22/2015        Health Maintenance                         Health Maintenance Due   Topic Date Due     HIV SCREEN (SYSTEM ASSIGNED)  12/28/2011

## 2018-04-28 NOTE — NURSING NOTE
"Chief Complaint   Patient presents with     URI       Initial /70  Pulse 89  Temp 98.6  F (37  C) (Oral)  Wt 216 lb (98 kg)  SpO2 99%  BMI 34.86 kg/m2 Estimated body mass index is 34.86 kg/(m^2) as calculated from the following:    Height as of 4/24/18: 5' 6\" (1.676 m).    Weight as of this encounter: 216 lb (98 kg).  Medication Reconciliation: complete     Octavia Urbina MA      "

## 2018-05-25 ENCOUNTER — APPOINTMENT (OUTPATIENT)
Dept: GENERAL RADIOLOGY | Facility: CLINIC | Age: 23
End: 2018-05-25
Attending: EMERGENCY MEDICINE
Payer: OTHER MISCELLANEOUS

## 2018-05-25 ENCOUNTER — HOSPITAL ENCOUNTER (EMERGENCY)
Facility: CLINIC | Age: 23
Discharge: HOME | End: 2018-05-25
Attending: EMERGENCY MEDICINE | Admitting: EMERGENCY MEDICINE
Payer: OTHER MISCELLANEOUS

## 2018-05-25 VITALS
HEART RATE: 114 BPM | RESPIRATION RATE: 16 BRPM | OXYGEN SATURATION: 95 % | TEMPERATURE: 98.5 F | SYSTOLIC BLOOD PRESSURE: 133 MMHG | DIASTOLIC BLOOD PRESSURE: 86 MMHG

## 2018-05-25 DIAGNOSIS — M25.512 ACUTE PAIN OF LEFT SHOULDER: ICD-10-CM
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
## 2018-05-25 PROCEDURE — 73030 X-RAY EXAM OF SHOULDER: CPT | Mod: LT

## 2018-05-25 PROCEDURE — 99283 EMERGENCY DEPT VISIT LOW MDM: CPT

## 2018-05-25 PROCEDURE — 25000132 ZZH RX MED GY IP 250 OP 250 PS 637: Performed by: EMERGENCY MEDICINE

## 2018-05-25 RX ORDER — IBUPROFEN 600 MG/1
600 TABLET, FILM COATED ORAL ONCE
Status: COMPLETED | OUTPATIENT
Start: 2018-05-25 | End: 2018-05-25

## 2018-05-25 RX ADMIN — IBUPROFEN 600 MG: 600 TABLET ORAL at 16:24

## 2018-05-25 ASSESSMENT — ENCOUNTER SYMPTOMS
HEADACHES: 1
ARTHRALGIAS: 1
NECK PAIN: 1
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
## 2018-05-25 NOTE — ED AVS SNAPSHOT
[facility] Emergency Department    [address]    [facility] [address]    Phone:  [phone]    Fax:  [phone]                                       Mathew Grove   MRN: [id number]    Department:  [facility] Emergency Department   Date of Visit:  5/25/2018           After Visit Summary Signature Page     I have received my discharge instructions, and my questions have been answered. I have discussed any challenges I see with this plan with the nurse or doctor.    ..........................................................................................................................................  Patient/Patient Representative Signature      ..........................................................................................................................................  Patient Representative Print Name and Relationship to Patient    ..................................................               ................................................  Date                                            Time    ..........................................................................................................................................  Reviewed by Signature/Title    ...................................................              ..............................................  Date                                                            Time
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
## 2019-01-02 ENCOUNTER — OFFICE VISIT (OUTPATIENT)
Dept: URGENT CARE | Facility: URGENT CARE | Age: 24
End: 2019-01-02
Payer: MEDICAID

## 2019-01-02 VITALS
TEMPERATURE: 98.6 F | BODY MASS INDEX: 35.99 KG/M2 | OXYGEN SATURATION: 97 % | SYSTOLIC BLOOD PRESSURE: 121 MMHG | DIASTOLIC BLOOD PRESSURE: 85 MMHG | WEIGHT: 223 LBS | HEART RATE: 95 BPM | RESPIRATION RATE: 18 BRPM

## 2019-01-02 DIAGNOSIS — R07.0 THROAT PAIN: Primary | ICD-10-CM

## 2019-01-02 DIAGNOSIS — J02.0 STREP THROAT: ICD-10-CM

## 2019-01-02 LAB
DEPRECATED S PYO AG THROAT QL EIA: ABNORMAL
SPECIMEN SOURCE: ABNORMAL

## 2019-01-02 PROCEDURE — 99213 OFFICE O/P EST LOW 20 MIN: CPT | Performed by: FAMILY MEDICINE

## 2019-01-02 PROCEDURE — 87880 STREP A ASSAY W/OPTIC: CPT | Performed by: FAMILY MEDICINE

## 2019-01-02 RX ORDER — PENICILLIN V POTASSIUM 500 MG/1
500 TABLET, FILM COATED ORAL 2 TIMES DAILY
Qty: 20 TABLET | Refills: 0 | Status: SHIPPED | OUTPATIENT
Start: 2019-01-02 | End: 2019-01-12

## 2019-01-02 NOTE — PROGRESS NOTES
SUBJECTIVE:   Mathew Grove is a 23 year old male presenting with a chief complaint of stuffy nose and sore throat and concern for strep throat   Onset of symptoms was 2 day(s) ago.  Course of illness is same.    Severity moderate  Denies the following: fever, sweats, ear pain, hoarse voice, facial pain/pressure, headache, body aches, fatigue and nausea  Treatment measures tried include Tylenol/Ibuprofen.  Predisposing factors include None. Denies known strep contacts      No past medical history on file.  No current outpatient medications on file.     Social History     Tobacco Use     Smoking status: Former Smoker     Packs/day: 0.20     Last attempt to quit: 2017     Years since quittin.0     Smokeless tobacco: Never Used   Substance Use Topics     Alcohol use: No     Alcohol/week: 1.2 oz     Types: 2 Standard drinks or equivalent per week       ROS:  Review of systems negative except as stated above.    OBJECTIVE:  /85 (BP Location: Left arm, Patient Position: Chair, Cuff Size: Adult Large)   Pulse 95   Temp 98.6  F (37  C) (Oral)   Resp 18   Wt 101.2 kg (223 lb)   SpO2 97%   BMI 35.99 kg/m    GENERAL APPEARANCE: healthy, alert and no distress  EYES: EOMI,  PERRL, conjunctiva clear  HENT: ear canals and TM's normal.  Noted erythematous soft palate and moderately enlarged palatine tonsils with mild exudate.   NECK: supple, nontender, no lymphadenopathy  RESP: lungs clear to auscultation - no rales, rhonchi or wheezes  CV: regular rates and rhythm, normal S1 S2, no murmur noted  ABDOMEN:  soft, nontender, no HSM or masses and bowel sounds normal  NEURO: Normal strength and tone, sensory exam grossly normal,  normal speech and mentation  SKIN: no suspicious lesions or rashes    Results for orders placed or performed in visit on 19   Strep, Rapid Screen   Result Value Ref Range    Specimen Description Throat     Rapid Strep A Screen (A)      POSITIVE: Group A Streptococcal antigen  detected by immunoassay.      ASSESSMENT:    ICD-10-CM    1. Throat pain R07.0 Strep, Rapid Screen   2. Strep throat J02.0 penicillin V (VEETID) 500 MG tablet      PLAN:  Symptom cares explained. Including use of chloroseptic spray and NSAIDs or tylenol prn Q 4 hours pain.   The patient indicates understanding of these issues and agrees with the plan..   Patient educational/instructional material provided including reasons for follow-up    Александр Funes MD

## 2019-05-29 ENCOUNTER — OFFICE VISIT (OUTPATIENT)
Dept: FAMILY MEDICINE | Facility: CLINIC | Age: 24
End: 2019-05-29
Payer: COMMERCIAL

## 2019-05-29 VITALS
WEIGHT: 234 LBS | HEART RATE: 109 BPM | SYSTOLIC BLOOD PRESSURE: 110 MMHG | OXYGEN SATURATION: 97 % | TEMPERATURE: 99.4 F | BODY MASS INDEX: 37.77 KG/M2 | DIASTOLIC BLOOD PRESSURE: 78 MMHG

## 2019-05-29 DIAGNOSIS — J06.9 UPPER RESPIRATORY TRACT INFECTION, UNSPECIFIED TYPE: ICD-10-CM

## 2019-05-29 DIAGNOSIS — R09.82 PND (POST-NASAL DRIP): ICD-10-CM

## 2019-05-29 DIAGNOSIS — R09.81 NASAL CONGESTION: Primary | ICD-10-CM

## 2019-05-29 PROCEDURE — 99213 OFFICE O/P EST LOW 20 MIN: CPT | Performed by: FAMILY MEDICINE

## 2019-05-29 RX ORDER — FLUTICASONE PROPIONATE 50 MCG
1-2 SPRAY, SUSPENSION (ML) NASAL DAILY
Qty: 9.9 ML | Refills: 1 | Status: SHIPPED | OUTPATIENT
Start: 2019-05-29

## 2019-05-29 ASSESSMENT — ANXIETY QUESTIONNAIRES
3. WORRYING TOO MUCH ABOUT DIFFERENT THINGS: SEVERAL DAYS
GAD7 TOTAL SCORE: 4
7. FEELING AFRAID AS IF SOMETHING AWFUL MIGHT HAPPEN: NOT AT ALL
6. BECOMING EASILY ANNOYED OR IRRITABLE: SEVERAL DAYS
IF YOU CHECKED OFF ANY PROBLEMS ON THIS QUESTIONNAIRE, HOW DIFFICULT HAVE THESE PROBLEMS MADE IT FOR YOU TO DO YOUR WORK, TAKE CARE OF THINGS AT HOME, OR GET ALONG WITH OTHER PEOPLE: NOT DIFFICULT AT ALL
1. FEELING NERVOUS, ANXIOUS, OR ON EDGE: SEVERAL DAYS
5. BEING SO RESTLESS THAT IT IS HARD TO SIT STILL: NOT AT ALL
2. NOT BEING ABLE TO STOP OR CONTROL WORRYING: NOT AT ALL

## 2019-05-29 ASSESSMENT — PATIENT HEALTH QUESTIONNAIRE - PHQ9
SUM OF ALL RESPONSES TO PHQ QUESTIONS 1-9: 3
5. POOR APPETITE OR OVEREATING: SEVERAL DAYS

## 2019-05-29 NOTE — PROGRESS NOTES
Subjective     Mathew Grove is a 23 year old male who presents to clinic today for the following health issues:    HPI   Acute Illness   Acute illness concerns: cough, sore throat  Onset: yesterday    Fever: no    Chills/Sweats: no    Headache (location?): no    Sinus Pressure:no    Conjunctivitis:  no    Ear Pain: no    Rhinorrhea: no    Congestion: YES    Sore Throat: YES     Cough: YES    Wheeze: no    Decreased Appetite: no    Nausea: no    Vomiting: YES- due to cough    Diarrhea:  YES    Dysuria/Freq.: no    Fatigue/Achiness: YES    Sick/Strep Exposure: no     Therapies Tried and outcome: otc congestion meds - no help          Reviewed and updated as needed this visit by Provider         Review of Systems   Negative other than noted above.      Objective    /78   Pulse 109   Temp 99.4  F (37.4  C) (Tympanic)   Wt 106.1 kg (234 lb)   SpO2 97%   BMI 37.77 kg/m    Body mass index is 37.77 kg/m .  Physical Exam   GENERAL: healthy, alert and no distress  NECK: no adenopathy, no asymmetry, masses, or scars and thyroid normal to palpation  RESP: lungs clear to auscultation - no rales, rhonchi or wheezes  CV: regular rate and rhythm, normal S1 S2, no S3 or S4, no murmur, click or rub, no peripheral edema and peripheral pulses strong    Diagnostic Test Results:  Labs reviewed in Epic        Assessment & Plan       ICD-10-CM    1. Nasal congestion R09.81 fluticasone (FLONASE) 50 MCG/ACT nasal spray   2. PND (post-nasal drip) R09.82 fluticasone (FLONASE) 50 MCG/ACT nasal spray   3. Upper respiratory tract infection, unspecified type J06.9      Patient has upper respiratory symptoms without any fever.  He is advised symptomatic care with Flonase rest and hydration.  If symptoms does not improve or any cough which is getting productive they are advised to follow-up.  Patient is requesting to be off work for the next 1 to 2 days which is appropriate.  If he needs more than that he needs to follow-up make sure  his symptoms are improving.    Luke Newton MD  [facility] [place], [place]

## 2019-05-30 ASSESSMENT — ANXIETY QUESTIONNAIRES: GAD7 TOTAL SCORE: 4

## 2022-02-08 NOTE — LETTER
To whom it may concern.      Mathew Grove      1995            Patient is seen in the clinic 5/29/2019. He is advised rest for 5/29/2019 and tomorrow.                    Sincerely,         Luke Newton MD   5/29/2019                                               Received medical records request from patient requesting all medical records for continuation of care.  All records printed and mailed to Dr[facility] [id number] [facility] [address] [address] [facility]